# Patient Record
Sex: FEMALE | Race: AMERICAN INDIAN OR ALASKA NATIVE
[De-identification: names, ages, dates, MRNs, and addresses within clinical notes are randomized per-mention and may not be internally consistent; named-entity substitution may affect disease eponyms.]

---

## 2017-01-17 ENCOUNTER — HOSPITAL ENCOUNTER (EMERGENCY)
Dept: HOSPITAL 5 - ED | Age: 57
Discharge: HOME | End: 2017-01-17
Payer: COMMERCIAL

## 2017-01-17 VITALS — SYSTOLIC BLOOD PRESSURE: 184 MMHG | DIASTOLIC BLOOD PRESSURE: 107 MMHG

## 2017-01-17 DIAGNOSIS — Z88.8: ICD-10-CM

## 2017-01-17 DIAGNOSIS — F17.200: ICD-10-CM

## 2017-01-17 DIAGNOSIS — Z79.4: ICD-10-CM

## 2017-01-17 DIAGNOSIS — L84: Primary | ICD-10-CM

## 2017-01-17 DIAGNOSIS — E11.9: ICD-10-CM

## 2017-01-17 PROCEDURE — 99282 EMERGENCY DEPT VISIT SF MDM: CPT

## 2017-01-17 NOTE — EMERGENCY DEPARTMENT REPORT
ED Extremity Problem HPI





- General


Chief complaint: Extremity Injury, Lower


Stated complaint: LEFT FOOT SORE/DIABETIC


Time Seen by Provider: 17 17:42


Source: patient


Mode of arrival: Ambulatory


Limitations: No Limitations





- History of Present Illness


Initial comments: 





Patient reports callus on her left palmar foot that started 1 week ago


MD Complaint: extremity pain (left foot)


Onset/Timin


-: week(s)


Location: left, lower extremity


History of Same: No


-: Yes arthralgia


Radiation: none


Severity scale (0 -10): 6


Quality: aching


Consistency: constant


Improves with: immobilization, rest


Worsens with: weight bearing


Associated Symptoms: denies other symptoms, arthralgias (left foot).  denies: 

chest pain, shortness of breath, fever, myalgias, rash





- Related Data


 Home Medications











 Medication  Instructions  Recorded  Confirmed  Last Taken


 


Insulin NPH, Human [Novolin N] 60 units LN DAILY 13 Unknown








 Previous Rx's











 Medication  Instructions  Recorded  Last Taken  Type


 


Cephalexin [Keflex] 1,000 mg PO BID 10 Days 13 Unknown Rx


 


Hydrocodone Bit/Acetaminophen 1 each PO Q8HR PRN #15 tablet 13 Unknown Rx





[Lortab 5-500 Tablet]    


 


Prednisone [predniSONE 10 mg 10 mg PO QDAY #5 tab.ds.pk 13 Unknown Rx





(6-Day Pack, 21 Tabs)]    


 


Acetaminophen [Acetaminophen  mg PO Q8HR PRN #20 tablet.er 17 

Unknown Rx





TAB]    











 Allergies











Allergy/AdvReac Type Severity Reaction Status Date / Time


 


iodine Allergy  Rash Verified 13 14:14














ED Review of Systems


ROS: 


Stated complaint: LEFT FOOT SORE/DIABETIC


Other details as noted in HPI





Constitutional: denies: chills, diaphoresis, fever, malaise, weakness


Respiratory: denies: cough, orthopnea, shortness of breath, SOB with exertion, 

SOB at rest, stridor, wheezing


Cardiovascular: denies: chest pain, palpitations, dyspnea on exertion, orthopnea

, edema, syncope, paroxysmal nocturnal dyspnea


Musculoskeletal: arthralgia (left foot).  denies: back pain, joint swelling, 

myalgia


Skin: other (callous left centeno foot)


Neurological: denies: headache, weakness, numbness, paresthesias, confusion, 

abnormal gait, vertigo





ED Past Medical Hx





- Past Medical History


Previous Medical History?: Yes


Hx Diabetes: Yes





- Surgical History


Past Surgical History?: Yes


Additional Surgical History: Bilateral breast cysts





- Social History


Smoking Status: Current Every Day Smoker


Substance Use Type: Prescribed





- Medications


Home Medications: 


 Home Medications











 Medication  Instructions  Recorded  Confirmed  Last Taken  Type


 


Cephalexin [Keflex] 1,000 mg PO BID 10 Days 13  Unknown Rx


 


Hydrocodone Bit/Acetaminophen 1 each PO Q8HR PRN #15 tablet 13  Unknown Rx





[Lortab 5-500 Tablet]     


 


Insulin NPH, Human [Novolin N] 60 units LN DAILY 13 Unknown 

History


 


Prednisone [predniSONE 10 mg 10 mg PO QDAY #5 tab.ds.pk 13  Unknown Rx





(6-Day Pack, 21 Tabs)]     


 


Acetaminophen [Acetaminophen  mg PO Q8HR PRN #20 tablet.er 17  

Unknown Rx





TAB]     














ED Physical Exam





- General


Limitations: No Limitations


General appearance: alert, in no apparent distress





- ENT


ENT exam: Present: normal exam, mucous membranes moist.  Absent: mucous 

membranes dry





- Respiratory


Respiratory exam: Present: normal lung sounds bilaterally.  Absent: respiratory 

distress, wheezes, rales, rhonchi, stridor, chest wall tenderness, accessory 

muscle use, decreased breath sounds, prolonged expiratory





- Cardiovascular


Cardiovascular Exam: Present: regular rate, normal rhythm, normal heart sounds.

  Absent: systolic murmur, diastolic murmur, rubs, gallop, clicks, JVD, S3, S4





- Expanded Lower Extremity Exam


  ** Left


Foot/Toe exam: Present: full ROM, tenderness (left centeno area 1 cm callous, no 

drainage, swelling or odor noted).  Absent: swelling, abrasion, laceration, 

ecchymosis, deformity, crepidus, dislocation, erythema, amputation, puncture 

wound, foreign body, calcaneal tenderness, tenderness at base of 5th metatarsal

, nail avulsion, subungual hematoma


Neuro vascular tendon exam: Present: no vascular compromise.  Absent: pulse 

deficit, abnormal cap refill, motor deficit, sensory deficit, tendon deficit, 

extremity cold to touch, pallor, abnormal 2-point discrimination, decreased fine

/light touch, foot drop, peroneal nerve deficit, significant pain with passive 

ROM of distal joint


Gait: Positive: observed and normal





- Neurological Exam


Neurological exam: Present: alert, oriented X3, CN II-XII intact, normal gait, 

reflexes normal.  Absent: motor sensory deficit





- Skin


Skin exam: Present: warm, dry, intact, normal color.  Absent: rash





ED Course


 Vital Signs











  17





  12:55


 


Temperature 98.5 F


 


Pulse Rate 99 H


 


Respiratory 20





Rate 


 


Blood Pressure 184/107














ED Medical Decision Making





- Lab Data








 Vital Signs











  17





  12:55


 


Temperature 98.5 F


 


Pulse Rate 99 H


 


Respiratory 20





Rate 


 


Blood Pressure 184/107














- Medical Decision Making





During the course of ED, all other systems are unremarkable except for 

documentation in HPI.  She was sent home with prescription for Tylenol, 

instructed to follow up podiatrist this week for further care, she verbalize 

understanding





- Differential Diagnosis


Callus of the left Foot, Bunion


Critical care attestation.: 


If time is entered above; I have spent that time in minutes in the direct care 

of this critically ill patient, excluding procedure time.








ED Disposition


Clinical Impression: 


 Callus of foot


Disposition: DISCHARGED TO HOME OR SELFCARE


Is pt being admited?: No


Does the pt Need Aspirin: No


Condition: Stable


Instructions:  Diabetic Foot Care (ED)


Additional Instructions: 


Take Medication as directed.  Follow up with the selective referrals given at 

discharge.





If you have diabetes or another condition that causes poor blood flow, consult 

your doctor before treating a corn and callus on your own.


Don't use a sharp object to trim the skin. Don't use a pumice stone if you have 

diabetes because your risk of infection is higher.


Moisturize your skin. Apply moisturizer to your hands and feet to help keep the 

skin soft.


Wear comfortable shoes and socks. Stick to well-fitting, cushioned shoes and 

socks until your corn or callus disappears.

















Prescriptions: 


Acetaminophen [Acetaminophen ER TAB] 650 mg PO Q8HR PRN #20 tablet.er


 PRN Reason: Pain


Referrals: 


PRIMARY CARE,MD [Primary Care Provider] - 3-5 Days


SAURAV CAREY DPM [Staff Physician] - 3-5 Days


Time of Disposition: 17:43

## 2017-12-26 ENCOUNTER — HOSPITAL ENCOUNTER (EMERGENCY)
Dept: HOSPITAL 5 - ED | Age: 57
LOS: 1 days | Discharge: HOME | End: 2017-12-27
Payer: SELF-PAY

## 2017-12-26 DIAGNOSIS — J44.1: Primary | ICD-10-CM

## 2017-12-26 DIAGNOSIS — J11.1: ICD-10-CM

## 2017-12-26 DIAGNOSIS — Z91.041: ICD-10-CM

## 2017-12-26 DIAGNOSIS — E11.9: ICD-10-CM

## 2017-12-26 DIAGNOSIS — F17.200: ICD-10-CM

## 2017-12-26 PROCEDURE — 82962 GLUCOSE BLOOD TEST: CPT

## 2017-12-26 PROCEDURE — 71020: CPT

## 2017-12-26 PROCEDURE — 99283 EMERGENCY DEPT VISIT LOW MDM: CPT

## 2017-12-26 NOTE — EMERGENCY DEPARTMENT REPORT
ED General Adult HPI





- General


Chief complaint: Upper Respiratory Infection


Stated complaint: FLU LIKE SYMPTOMS


Time Seen by Provider: 12/26/17 20:00


Source: patient


Mode of arrival: Ambulatory


Limitations: No Limitations





- History of Present Illness


Initial comments: 





Patient is a 57-year-old female past medical history of COPD who presents with 

cough and body aches that have been going on for the last 3 days.  Patient says 

she is has a cough that is severe and nothing really makes it better or worse.  

She is also complaining of body aches and subjective fever.  Patient states "I 

believe I have the flu."  She states that her body aches are a 5 out 10.  The 

body aches are gradual in onset.  Occur throughout her body she denies having 

any shortness of breath or any chest pain or any vomiting.  But she states that 

she feels weak and she feels congested.





- Related Data


 Home Medications











 Medication  Instructions  Recorded  Confirmed  Last Taken


 


Insulin NPH, Human [Novolin N] 60 units LN DAILY 12/13/13 12/13/13 Unknown








 Previous Rx's











 Medication  Instructions  Recorded  Last Taken  Type


 


Cephalexin [Keflex] 1,000 mg PO BID 10 Days  capsule 12/13/13 Unknown Rx


 


Hydrocodone Bit/Acetaminophen 1 each PO Q8HR PRN #15 tablet 12/13/13 Unknown Rx





[Lortab 5-500 Tablet]    


 


Prednisone [predniSONE 10 mg 10 mg PO QDAY #5 tab.ds.pk 12/13/13 Unknown Rx





(6-Day Pack, 21 Tabs)]    


 


Acetaminophen [Acetaminophen  mg PO Q8HR PRN #20 tablet.er 01/17/17 

Unknown Rx





TAB]    


 


ALBUTEROL Inhaler [ProAir HFA 2 puff IH QID PRN #1 inhalation 12/26/17 Unknown 

Rx





Inhaler]    


 


Dextromethorphan Polistirex 30 mg PO Q6H PRN #1 gentry.er.12h 12/26/17 Unknown Rx





[Delsym]    


 


predniSONE [Deltasone] 20 mg PO BID #10 tab 12/26/17 Unknown Rx











 Allergies











Allergy/AdvReac Type Severity Reaction Status Date / Time


 


iodine Allergy  Rash Verified 12/13/13 14:14














ED Review of Systems


ROS: 


Stated complaint: FLU LIKE SYMPTOMS


Other details as noted in HPI





Constitutional: fever, malaise.  denies: chills


Eyes: denies: eye pain, eye discharge, vision change


ENT: denies: ear pain, throat pain


Respiratory: cough.  denies: shortness of breath, wheezing


Cardiovascular: denies: chest pain, palpitations


Endocrine: no symptoms reported


Gastrointestinal: denies: abdominal pain, nausea, diarrhea


Genitourinary: denies: urgency, dysuria, discharge


Musculoskeletal: myalgia.  denies: back pain, joint swelling, arthralgia


Skin: denies: rash, lesions


Neurological: denies: headache, weakness, paresthesias


Psychiatric: denies: anxiety, depression


Hematological/Lymphatic: denies: easy bleeding, easy bruising





ED Past Medical Hx





- Past Medical History


Previous Medical History?: No


Hx Diabetes: Yes





- Surgical History


Past Surgical History?: Yes


Hx Breast Surgery: Yes


Additional Surgical History: Bilateral breast cysts





- Social History


Smoking Status: Current Every Day Smoker


Substance Use Type: Alcohol, Non Opiate Pain





- Medications


Home Medications: 


 Home Medications











 Medication  Instructions  Recorded  Confirmed  Last Taken  Type


 


Cephalexin [Keflex] 1,000 mg PO BID 10 Days  capsule 12/13/13  Unknown Rx


 


Hydrocodone Bit/Acetaminophen 1 each PO Q8HR PRN #15 tablet 12/13/13  Unknown Rx





[Lortab 5-500 Tablet]     


 


Insulin NPH, Human [Novolin N] 60 units LN DAILY 12/13/13 12/13/13 Unknown 

History


 


Prednisone [predniSONE 10 mg 10 mg PO QDAY #5 tab.ds.pk 12/13/13  Unknown Rx





(6-Day Pack, 21 Tabs)]     


 


Acetaminophen [Acetaminophen  mg PO Q8HR PRN #20 tablet.er 01/17/17  

Unknown Rx





TAB]     


 


ALBUTEROL Inhaler [ProAir HFA 2 puff IH QID PRN #1 inhalation 12/26/17  Unknown 

Rx





Inhaler]     


 


Dextromethorphan Polistirex 30 mg PO Q6H PRN #1 gentry.er.12h 12/26/17  Unknown Rx





[Delsym]     


 


predniSONE [Deltasone] 20 mg PO BID #10 tab 12/26/17  Unknown Rx














ED Physical Exam





- General


Limitations: No Limitations


General appearance: alert, in no apparent distress





- Head


Head exam: Present: atraumatic, normocephalic





- Eye


Eye exam: Present: normal appearance





- ENT


ENT exam: Present: mucous membranes moist





- Neck


Neck exam: Present: normal inspection





- Respiratory


Respiratory exam: Present: normal lung sounds bilaterally.  Absent: respiratory 

distress





- Cardiovascular


Cardiovascular Exam: Present: regular rate, normal rhythm.  Absent: systolic 

murmur, diastolic murmur, rubs, gallop





- GI/Abdominal


GI/Abdominal exam: Present: soft, normal bowel sounds





- Extremities Exam


Extremities exam: Present: normal inspection





- Back Exam


Back exam: Present: normal inspection





- Neurological Exam


Neurological exam: Present: alert, oriented X3





- Psychiatric


Psychiatric exam: Present: normal affect, normal mood





- Skin


Skin exam: Present: warm, dry, intact, normal color.  Absent: rash





ED Course





 Vital Signs











  12/26/17





  14:06


 


Temperature 99.2 F


 


Pulse Rate 103 H


 


Respiratory 22





Rate 


 


Blood Pressure 164/87


 


O2 Sat by Pulse 96





Oximetry 














ED Medical Decision Making





- Radiology Data


Radiology results: report reviewed, image reviewed





Chest x-ray: Shows no acute pulmonary disease





- Medical Decision Making





Chief medical diagnosis: Viral syndrome


Differential medical diagnosis: COPD exacerbation, pneumonia, hyperglycemia





I will get a point of care sugar, albuterol, steroids, chest x-ray and will 

reevaluate patient.








Patient's chest x-ray is unremarkable patient's feeling better after breathing 

treatment I will send patient home with prednisone albuterol and Tylenol to 

take for her pain discussed plan with patient patient additional verbal 

discharge instructions were given.





Critical care attestation.: 


If time is entered above; I have spent that time in minutes in the direct care 

of this critically ill patient, excluding procedure time.








ED Disposition


Clinical Impression: 


 COPD exacerbation, Influenza





Disposition: DC-01 TO HOME OR SELFCARE


Is pt being admited?: No


Does the pt Need Aspirin: No


Condition: Stable


Instructions:  Chronic Bronchitis (ED), Influenza (ED)


Prescriptions: 


ALBUTEROL Inhaler [ProAir HFA Inhaler] 2 puff IH QID PRN #1 inhalation


 PRN Reason: Shortness Of Breath


Dextromethorphan Polistirex [Delsym] 30 mg PO Q6H PRN #1 gentry.er.12h


 PRN Reason: Cough


predniSONE [Deltasone] 20 mg PO BID #10 tab


Referrals: 


FRANCISCO BOBBY MD [Staff Physician] - 3-5 Days


Forms:  Work/School Release Form(ED)

## 2017-12-26 NOTE — XRAY REPORT
FINAL REPORT



PROCEDURE:  XR CHEST ROUTINE 2V



TECHNIQUE:  Two view PA lateral chest



HISTORY:  cough 



COMPARISON:  No prior studies are available for comparison.



FINDINGS:  

Heart is not enlarged. Mild to moderate COPD with central

bronchiectasis and peribronchial cuffing. Shallow inspiration.



IMPRESSION:  

Shallow inspiration. Central bronchitis. No clarke consolidation

or effusion. COPD.

## 2017-12-27 VITALS — DIASTOLIC BLOOD PRESSURE: 57 MMHG | SYSTOLIC BLOOD PRESSURE: 144 MMHG

## 2019-12-08 ENCOUNTER — HOSPITAL ENCOUNTER (EMERGENCY)
Dept: HOSPITAL 5 - ED | Age: 59
Discharge: HOME | End: 2019-12-08
Payer: SELF-PAY

## 2019-12-08 VITALS — SYSTOLIC BLOOD PRESSURE: 150 MMHG | DIASTOLIC BLOOD PRESSURE: 81 MMHG

## 2019-12-08 DIAGNOSIS — M25.561: Primary | ICD-10-CM

## 2019-12-08 DIAGNOSIS — Z87.891: ICD-10-CM

## 2019-12-08 DIAGNOSIS — Z98.890: ICD-10-CM

## 2019-12-08 DIAGNOSIS — E11.9: ICD-10-CM

## 2019-12-08 DIAGNOSIS — Z79.4: ICD-10-CM

## 2019-12-08 DIAGNOSIS — Z91.041: ICD-10-CM

## 2019-12-08 NOTE — XRAY REPORT
RIGHT KNEE 3 VIEW(S)



INDICATION / CLINICAL INFORMATION:

knee pain



COMPARISON:

None available.

 

FINDINGS:



BONES / JOINT(S): No acute fracture or subluxation. No significant arthritis.



SOFT TISSUES: No significant abnormality.



ADDITIONAL FINDINGS: None.







Signer Name: Christiano Tomlinson MD 

Signed: 12/8/2019 4:54 PM

 Workstation Name: [id number]

## 2019-12-08 NOTE — EMERGENCY DEPARTMENT REPORT
ED General Adult HPI





- General


Chief complaint: Extremity Injury, Lower


Stated complaint: RT KNEE PAIN


Time Seen by Provider: 12/08/19 16:17


Source: patient


Mode of arrival: Wheelchair


Limitations: No Limitations





- History of Present Illness


Initial comments: 





Patient complains of right knee pain particularly on the medial aspect.  She 

states that she's been doing a lot of bending lately and this seems to be the 

precipitant.  Other than that she's had no trauma.  She doesn't complain of any 

posterior thigh or calf pain or any other complaint.


-: Gradual


Location: right, lower extremity


Radiation: non-radiation


Quality: aching


Consistency: intermittent


Improves with: none


Worsens with: movement


Associated Symptoms: denies other symptoms


Treatments Prior to Arrival: none





- Related Data


                                Home Medications











 Medication  Instructions  Recorded  Confirmed  Last Taken


 


Insulin NPH, Human [Novolin N] 60 units LN DAILY 12/13/13 12/13/13 Unknown








                                  Previous Rx's











 Medication  Instructions  Recorded  Last Taken  Type


 


Cephalexin [Keflex] 1,000 mg PO BID 10 Days  capsule 12/13/13 Unknown Rx


 


Hydrocodone Bit/Acetaminophen 1 each PO Q8HR PRN #15 tablet 12/13/13 Unknown Rx





[Lortab 5-500 Tablet]    


 


Prednisone [predniSONE 10 mg 10 mg PO QDAY #5 tab.ds.pk 12/13/13 Unknown Rx





(6-Day Pack, 21 Tabs)]    


 


Acetaminophen [Acetaminophen  mg PO Q8HR PRN #20 tablet.er 01/17/17 

Unknown Rx





TAB]    


 


ALBUTEROL Inhaler (OR & NICU) 2 puff IH QID PRN #1 inhalation 12/26/17 Unknown 

Rx





[ProAir HFA Inhaler]    


 


Dextromethorphan Polistirex 30 mg PO Q6H PRN #1 gentry.er.12h 12/26/17 Unknown Rx





[Delsym]    


 


predniSONE [Deltasone] 20 mg PO BID #10 tab 12/26/17 Unknown Rx


 


Naproxen [Naprosyn] 375 mg PO BID #14 tablet 12/08/19 Unknown Rx


 


traMADoL [Ultram] 50 mg PO Q6HR PRN #10 tablet 12/08/19 Unknown Rx











                                    Allergies











Allergy/AdvReac Type Severity Reaction Status Date / Time


 


iodine Allergy  Rash Verified 12/13/13 14:14














ED Review of Systems


ROS: 


Stated complaint: RT KNEE PAIN


Other details as noted in HPI





Constitutional: denies: chills, fever


Eyes: denies: eye pain, eye discharge, vision change


ENT: denies: ear pain, throat pain


Respiratory: denies: cough, shortness of breath, wheezing


Cardiovascular: denies: chest pain, palpitations


Endocrine: no symptoms reported


Gastrointestinal: denies: abdominal pain, nausea, diarrhea


Genitourinary: denies: urgency, dysuria


Musculoskeletal: arthralgia.  denies: back pain, joint swelling


Skin: denies: rash, lesions


Neurological: denies: headache, weakness, paresthesias


Psychiatric: denies: anxiety, depression


Hematological/Lymphatic: denies: easy bleeding, easy bruising





ED Past Medical Hx





- Past Medical History


Previous Medical History?: Yes


Hx Diabetes: Yes





- Surgical History


Past Surgical History?: Yes


Hx Breast Surgery: Yes


Additional Surgical History: Bilateral breast cysts





- Social History


Smoking Status: Former Smoker


Substance Use Type: None





- Medications


Home Medications: 


                                Home Medications











 Medication  Instructions  Recorded  Confirmed  Last Taken  Type


 


Cephalexin [Keflex] 1,000 mg PO BID 10 Days  capsule 12/13/13  Unknown Rx


 


Hydrocodone Bit/Acetaminophen 1 each PO Q8HR PRN #15 tablet 12/13/13  Unknown Rx





[Lortab 5-500 Tablet]     


 


Insulin NPH, Human [Novolin N] 60 units LN DAILY 12/13/13 12/13/13 Unknown 

History


 


Prednisone [predniSONE 10 mg 10 mg PO QDAY #5 tab.ds.pk 12/13/13  Unknown Rx





(6-Day Pack, 21 Tabs)]     


 


Acetaminophen [Acetaminophen  mg PO Q8HR PRN #20 tablet.er 01/17/17  

Unknown Rx





TAB]     


 


ALBUTEROL Inhaler (OR & NICU) 2 puff IH QID PRN #1 inhalation 12/26/17  Unknown 

Rx





[ProAir HFA Inhaler]     


 


Dextromethorphan Polistirex 30 mg PO Q6H PRN #1 gentry.er.12h 12/26/17  Unknown Rx





[Delsym]     


 


predniSONE [Deltasone] 20 mg PO BID #10 tab 12/26/17  Unknown Rx


 


Naproxen [Naprosyn] 375 mg PO BID #14 tablet 12/08/19  Unknown Rx


 


traMADoL [Ultram] 50 mg PO Q6HR PRN #10 tablet 12/08/19  Unknown Rx














ED Physical Exam





- General


Limitations: No Limitations


General appearance: alert, in no apparent distress





- Head


Head exam: Present: atraumatic, normocephalic





- Eye


Eye exam: Present: normal appearance.  Absent: scleral icterus





- ENT


ENT exam: Present: mucous membranes moist





- Neck


Neck exam: Present: normal inspection





- Respiratory


Respiratory exam: Absent: respiratory distress





- Cardiovascular


Cardiovascular Exam: Present: regular rate.  Absent: systolic murmur, diastolic 

murmur, rubs, gallop





- Extremities Exam


Extremities exam: Present: normal inspection, other (positive apprehension but 

appears like reasonably good range of motion with distraction.  There is no 

effusion.  There is no warmth.  There is no instability to Limited maneuver 

testing.)





- Neurological Exam


Neurological exam: Present: alert, oriented X3.  Absent: motor sensory deficit





- Psychiatric


Psychiatric exam: Present: normal affect, normal mood





- Skin


Skin exam: Present: warm, dry, intact, normal color.  Absent: rash





ED Course





                                   Vital Signs











  12/08/19





  16:16


 


Temperature 98.2 F


 


Pulse Rate 82


 


Respiratory 18





Rate 


 


Blood Pressure 157/89


 


O2 Sat by Pulse 100





Oximetry 














ED Medical Decision Making





- Radiology Data


Radiology results: report reviewed (no acute finding), image reviewed


Critical care attestation.: 


If time is entered above; I have spent that time in minutes in the direct care 

of this critically ill patient, excluding procedure time.








ED Disposition


Clinical Impression: 


Right knee pain


Qualifiers:


 Chronicity: acute Qualified Code(s): M25.561 - Pain in right knee





Disposition: DC-01 TO HOME OR SELFCARE


Is pt being admited?: No


Does the pt Need Aspirin: No


Condition: Stable


Instructions:  Arthralgia (ED)


Additional Instructions: 


Reevaluation is recommended with an orthopedic doctor.  Rest knee.  Limited 

weight bearing the next few days or until it feels better.  


Prescriptions: 


Naproxen [Naprosyn] 375 mg PO BID #14 tablet


traMADoL [Ultram] 50 mg PO Q6HR PRN #10 tablet


 PRN Reason: Pain


Referrals: 


CYNDI SANDOVAL MD [Staff Physician] - 2-3 Days


Time of Disposition: 17:13

## 2019-12-08 NOTE — EMERGENCY DEPARTMENT REPORT
Blank Doc





- Documentation


Documentation: 





59-year-old female that presents with right knee pain.





This initial assessment/diagnostic orders/clinical plan/treatment(s) is/are 

subject to change based on patient's health status, clinical progression and re-

assessment by fellow clinical providers in the ED.  Further treatment and workup

at subsequent clinical providers discretion.  Patient/guardians urged not to 

elope from the ED as their condition may be serious if not clinically assessed 

and managed.  Initial orders include:


1- Patient sent to [facility] for further evaluation and treatment


2- xrays

## 2021-03-10 ENCOUNTER — HOSPITAL ENCOUNTER (EMERGENCY)
Dept: HOSPITAL 5 - ED | Age: 61
LOS: 1 days | Discharge: HOME | End: 2021-03-11
Payer: COMMERCIAL

## 2021-03-10 VITALS — DIASTOLIC BLOOD PRESSURE: 82 MMHG | SYSTOLIC BLOOD PRESSURE: 169 MMHG

## 2021-03-10 DIAGNOSIS — S80.11XA: ICD-10-CM

## 2021-03-10 DIAGNOSIS — Z88.8: ICD-10-CM
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
## 2021-03-10 DIAGNOSIS — E11.9: ICD-10-CM

## 2021-03-10 DIAGNOSIS — F17.200: ICD-10-CM
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
## 2021-03-10 DIAGNOSIS — S83.91XA: Primary | ICD-10-CM

## 2021-03-10 DIAGNOSIS — Z79.899: ICD-10-CM

## 2021-03-10 PROCEDURE — 99284 EMERGENCY DEPT VISIT MOD MDM: CPT

## 2021-03-10 PROCEDURE — 96372 THER/PROPH/DIAG INJ SC/IM: CPT

## 2021-03-10 PROCEDURE — 73562 X-RAY EXAM OF KNEE 3: CPT

## 2021-03-10 NOTE — EMERGENCY DEPARTMENT REPORT
ED Fall HPI





- General


Chief Complaint: Extremity Injury, Lower


Stated Complaint: RT LEG INJURY


Source: patient


Mode of arrival: Ambulatory





- History of Present Illness


Initial Comments: 





Patient is a 60-year-old -American female with a history of 

non-insulin-dependent diabetes and chronic osteoarthritis who presents to the ED

with complaint of acute onset persistent severe right thigh and knee pain after 

she tripped on her dog and fell down the stairs landing on her right leg about 4

hours ago.  Patient states that the pain has been persistent, constant and 

severe with any active range of motion or ambulation.  Patient states that she 

is unable to bear weight on the right leg because of pain.  Patient denies hip 

pain, back pain, head or neck injuries, chest pain, shortness of breath, dizz

iness, syncope, seizures, nausea and vomiting, loss of consciousness, numbness 

and tingling or weakness of lower extremities bilaterally.


MD Complaint: fall, other (right knee and thigh pain)


-: Sudden, hour(s) (4)


Fall From: standing, down stairs (#) ( missed stairs and tripped and fell down)


When Fall Occurred: 4-6 hours PTA


Fall Witnessed: yes, by family


Place Fall Occurred: home


Loss of Consciousness: none


Prolonged Down Time?: no


Symptoms Prior to Fall: none


Location: other (right leg)


Location - Extremities: Right: Knee (Pain), Leg (pain)


Severity: severe


Severity scale (0 -10): 7


Quality: sharp, aching


Context: tripped/slipped


Associated Symptoms: denies.  denies: headache, neck pain, numbness, weakness, 

chest paint, shortness of breath, abdominal pain, hematuria, lightheaded, verti

go, other





- Related Data


                                Home Medications











 Medication  Instructions  Recorded  Confirmed  Last Taken


 


Insulin NPH, Human [Novolin N] 60 units LN DAILY 13 Unknown








                                  Previous Rx's











 Medication  Instructions  Recorded  Last Taken  Type


 


Cephalexin [Keflex] 1,000 mg PO BID 10 Days  capsule 13 Unknown Rx


 


Hydrocodone Bit/Acetaminophen 1 each PO Q8HR PRN #15 tablet 13 Unknown Rx





[Lortab 5-500 Tablet]    


 


Prednisone [predniSONE 10 mg 10 mg PO QDAY #5 tab.ds.pk 13 Unknown Rx





(6-Day Pack, 21 Tabs)]    


 


Acetaminophen [Acetaminophen  mg PO Q8HR PRN #20 tablet.er 17 

Unknown Rx





TAB]    


 


Albuterol Mdi (or & Nicu Only) 2 puff IH QID PRN #1 inhalation 17 Unknown 

Rx





[ProAir HFA Inhaler]    


 


Dextromethorphan Polistirex 30 mg PO Q6H PRN #1 gentry.er.12h 17 Unknown Rx





[Delsym]    


 


predniSONE [Deltasone] 20 mg PO BID #10 tab 17 Unknown Rx


 


Naproxen [Naprosyn] 375 mg PO BID #14 tablet 19 Unknown Rx


 


traMADoL [Ultram] 50 mg PO Q6HR PRN #10 tablet 19 Unknown Rx


 


Ibuprofen [Motrin] 800 mg PO Q8HR PRN #30 tablet 03/10/21 Unknown Rx


 


methOCARBAMOL [Robaxin TAB] 750 mg PO Q8H PRN #21 tablet 03/10/21 Unknown Rx











                                    Allergies











Allergy/AdvReac Type Severity Reaction Status Date / Time


 


iodine Allergy  Rash Verified 13 14:14














ED Review of Systems


ROS: 


Stated complaint: RT LEG INJURY


Other details as noted in HPI





Constitutional: denies: chills, fever


Eyes: denies: eye pain, eye discharge, vision change


ENT: denies: ear pain, throat pain


Respiratory: denies: cough, shortness of breath, wheezing


Cardiovascular: denies: chest pain, palpitations


Endocrine: no symptoms reported


Gastrointestinal: denies: abdominal pain, nausea, diarrhea


Genitourinary: denies: urgency, dysuria, discharge


Musculoskeletal: arthralgia (Right medial thigh and knee pain).  denies: back 

pain, joint swelling


Skin: denies: rash, lesions


Neurological: denies: headache, weakness, paresthesias


Psychiatric: denies: anxiety, depression


Hematological/Lymphatic: denies: easy bleeding, easy bruising





ED Past Medical Hx





- Past Medical History


Hx Diabetes: Yes





- Surgical History


Hx Breast Surgery: Yes


Additional Surgical History: Bilateral breast cysts





- Social History


Smoking Status: Current Every Day Smoker





- Medications


Home Medications: 


                                Home Medications











 Medication  Instructions  Recorded  Confirmed  Last Taken  Type


 


Cephalexin [Keflex] 1,000 mg PO BID 10 Days  capsule 13  Unknown Rx


 


Hydrocodone Bit/Acetaminophen 1 each PO Q8HR PRN #15 tablet 13  Unknown Rx





[Lortab 5-500 Tablet]     


 


Insulin NPH, Human [Novolin N] 60 units LN DAILY 13 Unknown 

History


 


Prednisone [predniSONE 10 mg 10 mg PO QDAY #5 tab.ds.pk 13  Unknown Rx





(6-Day Pack, 21 Tabs)]     


 


Acetaminophen [Acetaminophen  mg PO Q8HR PRN #20 tablet.er 17  

Unknown Rx





TAB]     


 


Albuterol Mdi (or & Nicu Only) 2 puff IH QID PRN #1 inhalation 17  Unknown

Rx





[ProAir HFA Inhaler]     


 


Dextromethorphan Polistirex 30 mg PO Q6H PRN #1 gentry.er.12h 17  Unknown Rx





[Delsym]     


 


predniSONE [Deltasone] 20 mg PO BID #10 tab 17  Unknown Rx


 


Naproxen [Naprosyn] 375 mg PO BID #14 tablet 19  Unknown Rx


 


traMADoL [Ultram] 50 mg PO Q6HR PRN #10 tablet 19  Unknown Rx


 


Ibuprofen [Motrin] 800 mg PO Q8HR PRN #30 tablet 03/10/21  Unknown Rx


 


methOCARBAMOL [Robaxin TAB] 750 mg PO Q8H PRN #21 tablet 03/10/21  Unknown Rx














ED Physical Exam





- General


Limitations: No Limitations


General appearance: alert, in no apparent distress





- Head


Head exam: Present: atraumatic, normocephalic, normal inspection





- Eye


Eye exam: Present: normal appearance, PERRL, EOMI


Pupils: Present: normal accommodation





- ENT


ENT exam: Present: normal exam, normal orophraynx, mucous membranes moist, TM's 

normal bilaterally, normal external ear exam





- Neck


Neck exam: Present: normal inspection, full ROM





- Respiratory


Respiratory exam: Present: normal lung sounds bilaterally.  Absent: respiratory 

distress, wheezes, rales, rhonchi, chest wall tenderness, accessory muscle use, 

decreased breath sounds, other





- Cardiovascular


Cardiovascular Exam: Present: regular rate, normal rhythm, normal heart sounds. 

Absent: systolic murmur, diastolic murmur, rubs, gallop





- GI/Abdominal


GI/Abdominal exam: Present: soft, normal bowel sounds.  Absent: distended, te

nderness, guarding, hyperactive bowel sounds, hypoactive bowel sounds





- Extremities Exam


Extremities exam: Present: normal inspection, full ROM, tenderness (Palpable 

right knee and right medial thigh tenderness), normal capillary refill.  Absent:

pedal edema, joint swelling, calf tenderness





- Back Exam


Back exam: Present: normal inspection, full ROM.  Absent: tenderness, CVA 

tenderness (R), CVA tenderness (L), muscle spasm, paraspinal tenderness, 

vertebral tenderness





- Neurological Exam


Neurological exam: Present: alert, oriented X3, CN II-XII intact, normal gait, 

reflexes normal





- Psychiatric


Psychiatric exam: Present: normal affect, normal mood





- Skin


Skin exam: Present: warm, dry, intact, normal color.  Absent: rash





ED Course


                                   Vital Signs











  03/10/21





  21:36


 


Temperature 99.8 F H


 


Pulse Rate 91 H


 


Respiratory 18





Rate 


 


Blood Pressure 169/82


 


O2 Sat by Pulse 99





Oximetry 














ED Medical Decision Making





- Radiology Data


Radiology results: report reviewed, image reviewed





[facility]  


                                     [address] [place] [address]  


 


                                            XRay Report   


                                               Signed  


 


Patient: SANJANA DAVALOS                                                     

          MR#: [id number]  


[id number]          


: 1960                                                                

Acct:[id number]      


 


Age/Sex: 60 / F                                                                

ADM Date: 03/10/21     


 


Loc: ED       


Attending Dr:   


 


 


Ordering Physician: BUD OVERTON  


Date of Service: 03/10/21  


Procedure(s): XR knee 3V RT  


Accession Number(s): [id number]  


 


cc: BUD OVERTON   


 


Fluoro Time In Minutes:   


 


RIGHT KNEE 3 VIEWS  


 


 INDICATION / CLINICAL INFORMATION:  


 MAIN  


 


 COMPARISON:  


 None available.  


 


 FINDINGS:  


 


 BONES / JOINT(S): No acute fracture or subluxation. No significant arthritis.  


 SOFT TISSUES: No significant abnormality.  


 


 ADDITIONAL FINDINGS: None.  


 


 


 


 


 


 Signer Name: Scar Sanabria MD   


 Signed: 3/10/2021 11:16 PM  


 Workstation Name: [id number]   


 


 


Transcribed By: ES  


Dictated By: Scar Sanabria MD  


Electronically Authenticated By: Scar Sanabria MD    


Signed Date/Time: 03/10/21 2316                                


 


 


 


DD/DT: 03/10/21 2314                                                            

 


TD/TT:


Print Cancel





- Medical Decision Making





This is a 60-year-old -American female with a history of 

non-insulin-dependent diabetes and chronic osteoarthritis who presents to the ED

with complaint of acute onset persistent severe right thigh and knee pain after 

she tripped on her dog and fell down the stairs landing on her right leg about 4

hours ago.  Patient states that the pain has been persistent, constant and 

severe with any active range of motion or ambulation.  Patient states that she 

is unable to bear weight on the right leg because of pain.  In the ED, patient 

is alert and oriented x3 and is not in distress.  Patient was treated for pain 

in the ED and right knee x-ray shows no acute fractures or subluxations.  Based 

on the history and physical exam findings as well as the imaging reports, 

patient symptoms are likely musculoskeletal following the fall.  Patient right 

knee was splinted with Ace wrap and patient also given crutches for ambulation. 

Patient was therefore discharged home on pain medications and advised to follow-

up with her primary care physician in 5 to 7 days for reevaluation or return to 

the ED immediately if symptoms get worse.





- Differential Diagnosis


Knee fracture; knee contusion; knee sprain; leg contusion; muscle strain


Critical care attestation.: 


If time is entered above; I have spent that time in minutes in the direct care 

of this critically ill patient, excluding procedure time.








ED Disposition


Clinical Impression: 


 Contusion of right lower leg, initial encounter





Sprain of right knee/leg


Qualifiers:


 Encounter type: initial encounter Qualified Code(s): S83.91XA - Sprain of 

unspecified site of right knee, initial encounter





Disposition: - TO HOME OR SELFCARE


Is pt being admited?: No


Does the pt Need Aspirin: No


Condition: Stable


Instructions:  Knee Sprain, Adult, Easy-to-Read, Contusion, Easy-to-Read


Additional Instructions: 


The right knee x-ray shows  no acute fractures or subluxations. Therefore take 

medications with food, drink plenty of fluids and follow up with your Primary 

care Physician in 7-10 days for reevaluation. Return to the ED immediately if 

symptoms get worse


Prescriptions: 


Ibuprofen [Motrin] 800 mg PO Q8HR PRN #30 tablet


 PRN Reason: Pain , Severe (7-10)


methOCARBAMOL [Robaxin TAB] 750 mg PO Q8H PRN #21 tablet


 PRN Reason: Muscle Spasm


Referrals: 


LINSEY MENG MD [Primary Care Provider] - 3-5 Days


Forms:  Work/School Release Form(ED)


Time of Disposition: 23:51


Print Language: ENGLISH

## 2021-03-10 NOTE — EVENT NOTE
ED Screening Note


Date of service: 03/10/21


Time: 22:53


ED Screening Note: 


This is a 60-year-old female presents to ED complaining of right thigh knee and 

calf pain status post fall down some stairs earlier today while she was walking 

her dog.





Patient states pain is excruciating.  Unable to apply pressure and stand.





This initial assessment/diagnostic orders/clinical plan/treatment(s) is/are 

subject to change based on patients health status, clinical progression and re-

assessment by fellow clinical providers in the ED. Further treatment and workup 

at subsequent clinical providers discretion. Patient/guardian urged not to elope

from the ED as their condition may be serious if not clinically assessed and 

managed. 





Initial orders include: 


X-ray knee.


Full eval

## 2022-07-06 ENCOUNTER — HOSPITAL ENCOUNTER (EMERGENCY)
Dept: HOSPITAL 5 - ED | Age: 62
LOS: 1 days | Discharge: HOME | End: 2022-07-07
Payer: COMMERCIAL

## 2022-07-06 DIAGNOSIS — Z98.890: ICD-10-CM

## 2022-07-06 DIAGNOSIS — F17.290: ICD-10-CM

## 2022-07-06 DIAGNOSIS — M25.561: Primary | ICD-10-CM

## 2022-07-06 DIAGNOSIS — Z91.041: ICD-10-CM

## 2022-07-06 DIAGNOSIS — E11.9: ICD-10-CM

## 2022-07-06 PROCEDURE — 99283 EMERGENCY DEPT VISIT LOW MDM: CPT

## 2022-07-07 VITALS — DIASTOLIC BLOOD PRESSURE: 94 MMHG | SYSTOLIC BLOOD PRESSURE: 170 MMHG

## 2022-07-07 NOTE — XRAY REPORT
Right knee, 3 views



HISTORY: Injury



COMPARISON: 3/10/2021



FINDINGS: No acute fracture or malalignment. No significant arthritis. No joint effusion.



IMPRESSION: No acute findings



Signer Name: Eugene Vazquez MD 

Signed: 7/7/2022 2:10 AM

Workstation Name: [id number]

## 2022-07-07 NOTE — EMERGENCY DEPARTMENT REPORT
ED Extremity Problem HPI





- General


Chief complaint: Extremity Injury, Lower


Stated complaint: LEFT LEG BURNING


Time Seen by Provider: 07/07/22 07:23


Source: patient


Mode of arrival: Ambulatory


Limitations: No Limitations





- History of Present Illness


Initial comments: 





61-year-old black female with a past medical history of diabetes presents to the

emergency department for 1 day history of worsening right knee pain.  She denies

any trauma or injury but states that yesterday her knee started hurting and is 

just been getting progressively worse since then.  She states that she has had 

intermittent knee pain in the past, was seen by a doctor who told her that her 

knee bones are scraping against each other.  She states that she has not 

followed up since then.


MD Complaint: extremity pain


-: Gradual, days(s) (1)


Location: right, knee


History of Same: Yes


-: No myalgia, No arthralgia, No fever, No associated dyspnea, No associated 

chest pain


Radiation: none


Severity scale (0 -10): 7


Quality: aching


Consistency: constant


Worsens with: weight bearing, walking


Associated Symptoms: denies: chest pain, shortness of breath, fever, myalgias, 

arthralgias, rash





- Related Data


                                Home Medications











 Medication  Instructions  Recorded  Confirmed  Last Taken


 


Insulin NPH, Human [Novolin N] 60 units LN DAILY 12/13/13 12/13/13 Unknown








                                  Previous Rx's











 Medication  Instructions  Recorded  Last Taken  Type


 


Cephalexin [Keflex] 1,000 mg PO BID 10 Days  capsule 12/13/13 Unknown Rx


 


Hydrocodone Bit/Acetaminophen 1 each PO Q8HR PRN #15 tablet 12/13/13 Unknown Rx





[Lortab 5-500 Tablet]    


 


Prednisone [predniSONE 10 mg 10 mg PO QDAY #5 tab.ds.pk 12/13/13 Unknown Rx





(6-Day Pack, 21 Tabs)]    


 


Acetaminophen [Acetaminophen  mg PO Q8HR PRN #20 tablet.er 01/17/17 

Unknown Rx





TAB]    


 


Albuterol Mdi (or & Nicu Only) 2 puff IH QID PRN #1 inhalation 12/26/17 Unknown 

Rx





[ProAir HFA Inhaler]    


 


Dextromethorphan Polistirex 30 mg PO Q6H PRN #1 gentry.er.12h 12/26/17 Unknown Rx





[Delsym]    


 


predniSONE [Deltasone] 20 mg PO BID #10 tab 12/26/17 Unknown Rx


 


Naproxen [Naprosyn] 375 mg PO BID #14 tablet 12/08/19 Unknown Rx


 


traMADoL [Ultram] 50 mg PO Q6HR PRN #10 tablet 12/08/19 Unknown Rx


 


Ibuprofen [Motrin] 800 mg PO Q8HR PRN #30 tablet 03/10/21 Unknown Rx


 


methOCARBAMOL [Robaxin TAB] 750 mg PO Q8H PRN #21 tablet 03/10/21 Unknown Rx


 


Naproxen [Naprosyn] 500 mg PO BID PRN #14 tab 07/07/22 Unknown Rx











                                    Allergies











Allergy/AdvReac Type Severity Reaction Status Date / Time


 


iodine Allergy  Rash Verified 12/13/13 14:14














ED Review of Systems


ROS: 


Stated complaint: LEFT LEG BURNING


Other details as noted in HPI





Comment: All other systems reviewed and negative


Constitutional: denies: chills, fever


Respiratory: denies: shortness of breath


Cardiovascular: denies: chest pain


Gastrointestinal: denies: abdominal pain, nausea, vomiting


Musculoskeletal: denies: back pain





ED Past Medical Hx





- Past Medical History


Hx Diabetes: Yes





- Surgical History


Hx Breast Surgery: Yes


Additional Surgical History: Bilateral breast cysts





- Social History


Smoking Status: Current Every Day Smoker





- Medications


Home Medications: 


                                Home Medications











 Medication  Instructions  Recorded  Confirmed  Last Taken  Type


 


Cephalexin [Keflex] 1,000 mg PO BID 10 Days  capsule 12/13/13  Unknown Rx


 


Hydrocodone Bit/Acetaminophen 1 each PO Q8HR PRN #15 tablet 12/13/13  Unknown Rx





[Lortab 5-500 Tablet]     


 


Insulin NPH, Human [Novolin N] 60 units LN DAILY 12/13/13 12/13/13 Unknown 

History


 


Prednisone [predniSONE 10 mg 10 mg PO QDAY #5 tab.ds.pk 12/13/13  Unknown Rx





(6-Day Pack, 21 Tabs)]     


 


Acetaminophen [Acetaminophen  mg PO Q8HR PRN #20 tablet.er 01/17/17  

Unknown Rx





TAB]     


 


Albuterol Mdi (or & Nicu Only) 2 puff IH QID PRN #1 inhalation 12/26/17  Unknown

Rx





[ProAir HFA Inhaler]     


 


Dextromethorphan Polistirex 30 mg PO Q6H PRN #1 gentry.er.12h 12/26/17  Unknown Rx





[Delsym]     


 


predniSONE [Deltasone] 20 mg PO BID #10 tab 12/26/17  Unknown Rx


 


Naproxen [Naprosyn] 375 mg PO BID #14 tablet 12/08/19  Unknown Rx


 


traMADoL [Ultram] 50 mg PO Q6HR PRN #10 tablet 12/08/19  Unknown Rx


 


Ibuprofen [Motrin] 800 mg PO Q8HR PRN #30 tablet 03/10/21  Unknown Rx


 


methOCARBAMOL [Robaxin TAB] 750 mg PO Q8H PRN #21 tablet 03/10/21  Unknown Rx


 


Naproxen [Naprosyn] 500 mg PO BID PRN #14 tab 07/07/22  Unknown Rx














ED Physical Exam





- General


Limitations: No Limitations


General appearance: alert, in no apparent distress





- Head


Head exam: Present: atraumatic, normocephalic





- Eye


Eye exam: Present: normal appearance.  Absent: conjunctival injection





- Neck


Neck exam: Present: normal inspection





- Respiratory


Respiratory exam: Absent: respiratory distress





- Cardiovascular


Cardiovascular Exam: Present: tachycardia





- GI/Abdominal


GI/Abdominal exam: Absent: distended





- Extremities Exam


Extremities exam: Present: normal inspection





- Expanded Lower Extremity Exam


  ** Right


Knee exam: Present: normal inspection, full ROM, tenderness.  Absent: swelling, 

abrasion, laceration, ecchymosis, deformity, crepidus, dislocation, erythema, 

effusion


Lower Leg exam: Present: normal inspection


Ankle exam: Present: normal inspection


Foot/Toe exam: Present: normal inspection


Neuro vascular tendon exam: Present: no vascular compromise.  Absent: pulse 

deficit, abnormal cap refill, motor deficit, sensory deficit, extremity cold to 

touch, pallor


Gait: Positive: observed and limited by pain





- Back Exam


Back exam: Present: normal inspection





- Neurological Exam


Neurological exam: Present: alert, oriented X3





- Psychiatric


Psychiatric exam: Present: normal affect, normal mood





- Skin


Skin exam: Present: warm, dry, intact, normal color





ED Course





                                   Vital Signs











  07/07/22





  00:28


 


Temperature 98.3 F


 


Pulse Rate 101 H


 


Respiratory 16





Rate 


 


Blood Pressure 170/94


 


O2 Sat by Pulse 97





Oximetry 














ED Medical Decision Making





- Radiology Data


Radiology results: report reviewed, image reviewed





Right knee x-ray:


 FINDINGS: No acute fracture or malalignment. No significant arthritis. No joint

 effusion.  


 


 IMPRESSION: No acute findings  





- Medical Decision Making





61-year-old black female with a past medical history of diabetes presents to the

 emergency department for 1 day history of worsening right knee pain.  She 

denies any trauma or injury but states that yesterday her knee started hurting 

and is just been getting progressively worse since then.  She states that she 

has had intermittent knee pain in the past, was seen by a doctor who told her 

that her knee bones are scraping against each other.  She states that she has 

not followed up since then





Right knee x-ray without any acute abnormalities noted.  Physical exam 

unremarkable.  Patient will be treated with Toradol and Flexeril in the 

emergency department and discharged home with naproxen to use for pain.  She is 

advised to follow-up with orthopedics for further evaluation and management or 

return to the emergency department as needed.  She verbalizes understanding of 

and agreement with plan of care.


Critical care attestation.: 


If time is entered above; I have spent that time in minutes in the direct care 

of this critically ill patient, excluding procedure time.








ED Disposition


Clinical Impression: 


Right knee pain


Qualifiers:


 Chronicity: acute Qualified Code(s): M25.561 - Pain in right knee





Disposition: 01 HOME / SELF CARE / HOMELESS


Is pt being admited?: No


Does the pt Need Aspirin: No


Condition: Stable


Instructions:  Acute Knee Pain, Adult, Easy-to-Read, How to Use Cold Therapy, 

Easy-to-Read, Musculoskeletal Pain


Additional Instructions: 


Take medications as prescribed.  Follow-up with orthopedics for further 

evaluation and management.  Return to the emergency department as needed.


Prescriptions: 


Naproxen [Naprosyn] 500 mg PO BID PRN #14 tab


 PRN Reason: Pain, Moderate (4-6)


Referrals: 


CYNDI SANDOVAL MD [Staff Physician] - 3-5 Days


Forms:  Work/School Release Form(ED)


Time of Disposition: 08:22